# Patient Record
Sex: FEMALE | Employment: FULL TIME | URBAN - METROPOLITAN AREA
[De-identification: names, ages, dates, MRNs, and addresses within clinical notes are randomized per-mention and may not be internally consistent; named-entity substitution may affect disease eponyms.]

---

## 2017-02-06 ASSESSMENT — ACTIVITIES OF DAILY LIVING (ADL)
ARE_THERE_CARBON_MONOXIDE_DETECTORS_IN_YOUR_HOME?: Y
DO_MEMBERS_OF_YOUR_HOUSEHOLD_WEAR_SEAT_BELTS?: Y
DO_MEMBERS_OF_YOUR_HOUSEHOLD_USE_SUNSCREEN?: Y
ARE_THERE_FIREARMS_IN_YOUR_HOME?: N
ARE_THERE_SMOKE_DETECTORS_IN_YOUR_HOME?: Y
DO_MEMBERS_OF_YOUR_HOUSEHOLD_USE_SAFETY_HELMETS?: Y

## 2017-02-20 ENCOUNTER — OFFICE VISIT (OUTPATIENT)
Dept: INTERNAL MEDICINE | Facility: CLINIC | Age: 62
End: 2017-02-20

## 2017-02-20 VITALS
SYSTOLIC BLOOD PRESSURE: 130 MMHG | DIASTOLIC BLOOD PRESSURE: 78 MMHG | RESPIRATION RATE: 18 BRPM | BODY MASS INDEX: 28.88 KG/M2 | WEIGHT: 178.9 LBS | OXYGEN SATURATION: 94 % | HEART RATE: 86 BPM

## 2017-02-20 DIAGNOSIS — E03.4 HYPOTHYROIDISM DUE TO ACQUIRED ATROPHY OF THYROID: ICD-10-CM

## 2017-02-20 DIAGNOSIS — F33.41 RECURRENT MAJOR DEPRESSIVE DISORDER, IN PARTIAL REMISSION (H): ICD-10-CM

## 2017-02-20 DIAGNOSIS — R73.9 HYPERGLYCEMIA: ICD-10-CM

## 2017-02-20 DIAGNOSIS — R79.89 ELEVATED LIVER FUNCTION TESTS: ICD-10-CM

## 2017-02-20 DIAGNOSIS — E03.4 HYPOTHYROIDISM DUE TO ACQUIRED ATROPHY OF THYROID: Primary | ICD-10-CM

## 2017-02-20 LAB
ALBUMIN SERPL-MCNC: 3.9 G/DL (ref 3.4–5)
ALP SERPL-CCNC: 152 U/L (ref 40–150)
ALT SERPL W P-5'-P-CCNC: 69 U/L (ref 0–50)
ANION GAP SERPL CALCULATED.3IONS-SCNC: 9 MMOL/L (ref 3–14)
AST SERPL W P-5'-P-CCNC: 38 U/L (ref 0–45)
BILIRUB SERPL-MCNC: 0.3 MG/DL (ref 0.2–1.3)
BUN SERPL-MCNC: 18 MG/DL (ref 7–30)
CALCIUM SERPL-MCNC: 9.1 MG/DL (ref 8.5–10.1)
CHLORIDE SERPL-SCNC: 103 MMOL/L (ref 94–109)
CO2 SERPL-SCNC: 28 MMOL/L (ref 20–32)
CREAT SERPL-MCNC: 0.91 MG/DL (ref 0.52–1.04)
GFR SERPL CREATININE-BSD FRML MDRD: 63 ML/MIN/1.7M2
GLUCOSE SERPL-MCNC: 95 MG/DL (ref 70–99)
POTASSIUM SERPL-SCNC: 4.1 MMOL/L (ref 3.4–5.3)
PROT SERPL-MCNC: 7.7 G/DL (ref 6.8–8.8)
SODIUM SERPL-SCNC: 140 MMOL/L (ref 133–144)
TSH SERPL DL<=0.005 MIU/L-ACNC: 0.85 MU/L (ref 0.4–4)

## 2017-02-20 RX ORDER — BUPROPION HYDROCHLORIDE 300 MG/1
300 TABLET ORAL EVERY MORNING
Qty: 90 TABLET | Refills: 3 | Status: SHIPPED | OUTPATIENT
Start: 2017-02-20

## 2017-02-20 RX ORDER — VENLAFAXINE 37.5 MG/1
37.5 TABLET ORAL 2 TIMES DAILY
Qty: 180 TABLET | Refills: 3 | Status: SHIPPED | OUTPATIENT
Start: 2017-02-20

## 2017-02-20 ASSESSMENT — PAIN SCALES - GENERAL: PAINLEVEL: NO PAIN (0)

## 2017-02-20 NOTE — PROGRESS NOTES
Alysia Benavides is a 61 year old year old female being seen today for   Chief Complaint   Patient presents with     Physical     Patient is here for physical.        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  She has generally been doing well.  She has no specific complaints today.  Since I last saw her, her  was diagnosed with prostate cancer and so there has been stress in the family about that.  However, he is generally doing well.  She says her mood is a little bit down related to that as well as the current political climate, but overall, she feels like she is doing well.  She is helping care for her 2 grandchildren, a couple times a week and enjoys being helpful to her daughter and son-in-law.  PHQ today was 5.  Rest of review of systems was reviewed and was negative.           ASSESSMENT/PLAN:ASSESSMENT AND PLAN:  Generally healthy 61-year-old woman here for annual physical exam.  Current issues addressed today include:     1.  Hypothyroidism.  Check TSH on her current dose of levothyroxine which I refilled today.   2.  History of dysthymia or major depressive disorder in the past.  Currently in remission with PHQ-9 of 5 today.  We will refill her bupropion and venlafaxine at her current doses.   3.  History of mild hyperglycemia without diabetes.  We will check a glucose today.   4.  Previous elevated liver function tests, normal when last checked a year ago.  We will recheck on her current dose of venlafaxine.   5.  She is up to date on mammogram, colonoscopy and flu shot.  Pelvic exam was performed today.  Last Pap smear was 2 years ago and was normal with negative HPV testing.  Lipids were last checked in 2014.  She had a flu shot this year and is up to date on other immunizations.   6.  Follow up in 1 year or sooner as needed.                 Patient Active Problem List   Diagnosis     Depression     Hypothyroidism     Hyperglycemia     Elevated liver  enzymes       Family History   Problem Relation Age of Onset     CEREBROVASCULAR DISEASE Mother       at 94     Dementia Father      Coronary Artery Disease Father 84      at 96       Immunization History   Administered Date(s) Administered     Influenza (IIV3) 10/28/2013     Influenza (intradermal) 10/14/2015     Tdap (Adacel,Boostrix) 2009     Zoster vaccine, live 2013       Lab Results   Component Value Date    PAP NIL 2015       Results for orders placed or performed during the hospital encounter of 14   COLONOSCOPY   Result Value Ref Range    COLONOSCOPY       [facility], [place]  [address] [place] [address] [phone]     Endoscopy Department  _______________________________________________________________________________  Patient Name: Alysia Bneavides              Procedure Date: 3/6/2014 10:03:SS A3/P3     MRN: [id number]                       Account Number: [id number]                 YOB: 1955              Admit Type: Outpatient                      Age: 58                               [facility]                     Gender: Female                        Note Status: Finalized                      Attending MD: Rehana Reilly MD    Pause for the Cause: Pause for the cause   completed  _______________________________________________________________________________     Procedure:                Colonoscopy  Indications:              Screening for colorectal malignant neoplasm  Providers:                Rehana Peace MD, Jazmyne Farmer RN  Referring MD:             Mckenna Noe MD  Medicines:                Midazolam 4 mg IV, Fentanyl 100 micrograms IV  Complications:            No immediate complications  _______________________________________________________________________________  Procedure:                Pre-Anesthesia Assessment:                            - Prior to the procedure, a History and Physical                              was performed, and patient medications and                             allergies were reviewed. The patient is competent.                             The risks and benefits of the procedure and the                             sedation options and risks were discussed with the                             patient. All questions were answered and informed                             consent was obtained. Patient identification and                             proposed procedure were verified by the physician                             in the procedure room. Mental Status Examination:                             alert and oriented. Airway Examination: normal                             oropharyngeal airway and neck mobility. Respiratory                             Examination: clear to auscultation. CV Examination:                             normal. Prophylactic Antibiotics: The patient does                             not require prophylactic antibiotics. Prior                             Anticoagulants: The patient has taken no previous                             anticoagulant or antiplatelet agents. ASA Grade                             Assessment: I - A normal, healthy patient. After                             reviewing the risks and benefits, the patient was                             deemed in satisfactory condition to undergo the                             procedure. The anesthesia plan was to use moderate                             sedation / analgesia (conscious sedation).                             Immediately prior to administration of medications,                             the patient was re-assessed for adequacy to receive                             sedatives. The heart rate, respiratory rate, oxygen                             saturations, blood pressure, adequacy of pulmonary                             ventilation, and response to care were monitored                              throughout the procedure. The physical status of                             the patient was re-assessed after the procedure.                            After obtaining informed consent, the colonoscope                             was passed under direct vision. Throughout the                             procedure, the patient's blood pressure, pulse, and                             oxygen saturations were monitored continuously. The                             Colonoscope was introduced through the anus and                             advanced to the cecum, identified by appendiceal                             orifice & ileocecal valve. The colonoscopy was                             performed without difficulty. The patient tolerated                             the procedure well. The quality of the bowel                             preparation was excellent.                                                                                   Findings:       The perianal exam was abnormal. Findings include thrombosed external        hemorrhoids. The colon (entire examined portion) appeared normal.                                                                                   Impression:               - Thrombosed external hemorrhoids.                            - The entire examined colon is normal.  Recommendation:           - Repeat colonoscopy in 10 years for screening                             purposes.                                                                                     electronically signed by Rehana Peace  ______________________  Rehana Peace MD  Signed Date: 3/6/2014 11:03:SS A3/P3  Number of Addenda: 0  I was physically present for the entire viewing portion of the exam.  __________________________  Signature of teaching physician  B4c/D4c  Note Initiated On: 3/6/2014 10:03:SS A3/P3  Scope Withdrawal Time: 0 hours 0 minutes 0 seconds   Scope Withdrawal Time: 0 hours 0 minutes 0  seconds   Total Procedure Duration: 0 hours 0 minutes 0 seconds   Total Procedure Duration: 0 hours 0 minutes 0 seconds    ]    Ma Screening Digital Bilateral    Result Date: 7/20/2016  Narrative: Examination: Bilateral digital screening mammography with computer aided detection. Comparison: 7/14/2015, 5/27/2014, and 5/7/2013 History: No symptoms, routine screening.  Benign left breast biopsy. Per prior questionnaire, [employer] descent. BREAST DENSITY: Scattered fibroglandular densities. COMMENTS: No significant change.         ROS:  SEe below - reviewed with patient    Current Outpatient Prescriptions   Medication Sig Dispense Refill     venlafaxine (EFFEXOR) 37.5 MG tablet Take 1 tablet (37.5 mg) by mouth 2 times daily 180 tablet 3     levothyroxine (SYNTHROID, LEVOTHROID) 175 MCG tablet Take 1 tablet (175 mcg) by mouth daily 90 tablet 3     buPROPion (WELLBUTRIN XL) 300 MG 24 hr tablet Take 1 tablet (300 mg) by mouth every morning 90 tablet 3     nitrofurantoin, macrocrystal-monohydrate, (MACROBID) 100 MG capsule Take 1 capsule (100 mg) by mouth 2 times daily 20 capsule 3     Omega-3 Fatty Acids (OMEGA-3 FISH OIL PO) Take 1 capsule by mouth daily Omega-3 Krill oil       Multiple Vitamin (DAILY MULTIVITAMIN PO)        ascorbic acid (VITAMIN C) 1000 MG TABS Take 2,000 mg by mouth daily       Cholecalciferol (VITAMIN D) 2000 UNITS CAPS Take 4,000 Int'l Units by mouth daily       chromium 200 MCG CAPS Take 200 mcg by mouth daily       Cyanocobalamin (VITAMIN B-12) 2000 MCG TBCR Place under the tongue daily       Probiotic Product (PROBIOTIC DAILY PO)                  PHYSICAL EXAM:     /78  Pulse 86  Resp 18  Wt 81.1 kg (178 lb 14.4 oz)  SpO2 94%  Breastfeeding? No  BMI 28.88 kg/m2    Wt Readings from Last 1 Encounters:   02/20/17 81.1 kg (178 lb 14.4 oz)       Constitutional: no distress, comfortable, pleasant   Eyes: anicteric, normal extra-ocular movements   Ears, Nose and Throat: tympanic membranes  clear, nose clear and free of lesions, throat clear, neck supple with full range of motion, no thyromegaly.   Cardiovascular: regular rate and rhythm, normal S1 and S2, no murmurs, rubs or gallops, peripheral pulses full and symmetric   Respiratory: clear to auscultation, no wheezes or crackles, normal breath sounds   Gastrointestinal: positive bowel sounds, nontender, no hepatosplenomegaly, no masses   Gentiourinary: normal external genitalia,  no enlargement of the [place] or [place] glands, urethra normal, perineum normal and free of lesions,  no masses or cervical motion tenderness, adnexa without enlargement or lesions  Musculoskeletal: full range of motion, no edema   Skin: no concerning lesions, no jaundice   Breast: breast are symmetric, no lumps, no nipple discharge or dimpling  Neurological: cranial nerves intact, normal strength and sensation, reflexes at patella and biceps normal, normal gait, no tremor   Psychological: appropriate mood   Lymphatic: no cervical, supraclavicular or axillary lymphadenopathy      Mckenna Noe MD    Answers for HPI/ROS submitted by the patient on 2/6/2017   General Symptoms: No  Skin Symptoms: No  HENT Symptoms: No  EYE SYMPTOMS: No  HEART SYMPTOMS: No  LUNG SYMPTOMS: No  INTESTINAL SYMPTOMS: No  URINARY SYMPTOMS: No  GYNECOLOGIC SYMPTOMS: No  BREAST SYMPTOMS: No  SKELETAL SYMPTOMS: No  BLOOD SYMPTOMS: No  NERVOUS SYSTEM SYMPTOMS: No  MENTAL HEALTH SYMPTOMS: No

## 2017-02-20 NOTE — MR AVS SNAPSHOT
After Visit Summary   2/20/2017    Alysia Benavides    MRN: [id number]           Patient Information     Date Of Birth          1955        Visit Information        Provider Department      2/20/2017 4:30 PM Mckenna Noe MD [facility]        Today's Diagnoses     Hypothyroidism due to acquired atrophy of thyroid    -  1    Recurrent major depressive disorder, in partial remission (H)        Hyperglycemia        Elevated liver function tests          Care Instructions    Primary Care Center Medication Refill Request Information:  * Please contact your pharmacy regarding ANY request for medication refills.  ** [facility] Prescription Fax = [phone]  * Please allow 3 business days for routine medication refills.  * Please allow 5 business days for controlled substance medication refills.     Primary Care Center Test Result notification information:  *You will be notified with in 7-10 days of your appointment day regarding the results of your test.  If you are on MyChart you will be notified as soon as the provider has reviewed the results and signed off on them.    Primary Care Center Phone Number [phone]          Follow-ups after your visit        Who to contact     Please call your clinic at [phone] to:    Ask questions about your health    Make or cancel appointments    Discuss your medicines    Learn about your test results    Speak to your doctor   If you have compliments or concerns about an experience at your clinic, or if you wish to file a complaint, please contact [facility] Patient Relations at [phone] or email us at [email or web address]         Additional Information About Your Visit        MyChart Information     [employer] gives you secure access to your electronic health record. If you see a primary care provider, you can also send messages to your care team and make appointments. If you have questions, please call your  primary care clinic.  If you do not have a primary care provider, please call [phone] and they will assist you.      [employer]flux is an electronic gateway that provides easy, online access to your medical records. With [employer]flux, you can request a clinic appointment, read your test results, renew a prescription or communicate with your care team.     To access your existing account, please contact your [facility] or call [phone] for assistance.        Care EveryWhere ID     This is your Care EveryWhere ID. This could be used by other organizations to access your [facility] medical records  [id number]        Your Vitals Were     Pulse Respirations Pulse Oximetry Breastfeeding? BMI (Body Mass Index)       86 18 94% No 28.88 kg/m2        Blood Pressure from Last 3 Encounters:   02/20/17 130/78   01/06/16 112/70   03/11/15 129/74    Weight from Last 3 Encounters:   02/20/17 81.1 kg (178 lb 14.4 oz)   01/06/16 77.5 kg (170 lb 14.4 oz)   03/11/15 77.9 kg (171 lb 11.2 oz)                 Where to get your medicines      These medications were sent to [facility] - [address] [address]     Phone:  [phone]     buPROPion 300 MG 24 hr tablet    venlafaxine 37.5 MG tablet          Primary Care Provider Office Phone # Fax #    Mckenna Noe [phone]        [facility] [address] [place] [address] [address] [place] [id number]        Thank you!     Thank you for choosing [facility]  for your care. Our goal is always to provide you with excellent care. Hearing back from our patients is one way we can continue to improve our services. Please take a few minutes to complete the written survey that you may receive in the mail after your visit with us. Thank you!             Your Updated Medication List - Protect others around you: Learn how to safely use, store and throw away your  medicines at www.disposemymeds.org.          This list is accurate as of: 2/20/17 11:59 PM.  Always use your most recent med list.                   Brand Name Dispense Instructions for use    ascorbic acid 1000 MG Tabs    vitamin C     Take 2,000 mg by mouth daily       buPROPion 300 MG 24 hr tablet    WELLBUTRIN XL    90 tablet    Take 1 tablet (300 mg) by mouth every morning       chromium 200 MCG Caps capsule      Take 200 mcg by mouth daily       DAILY MULTIVITAMIN PO          levothyroxine 175 MCG tablet    SYNTHROID/LEVOTHROID    90 tablet    Take 1 tablet (175 mcg) by mouth daily       nitrofurantoin (macrocrystal-monohydrate) 100 MG capsule    MACROBID    20 capsule    Take 1 capsule (100 mg) by mouth 2 times daily       OMEGA-3 FISH OIL PO      Take 1 capsule by mouth daily Omega-3 Krill oil       PROBIOTIC DAILY PO          venlafaxine 37.5 MG tablet    EFFEXOR    180 tablet    Take 1 tablet (37.5 mg) by mouth 2 times daily       VITAMIN B-12 2000 MCG Tbcr      Place under the tongue daily       vitamin D 2000 UNITS Caps      Take 4,000 Int'l Units by mouth daily

## 2017-02-20 NOTE — PATIENT INSTRUCTIONS
Primary Care Center Medication Refill Request Information:  * Please contact your pharmacy regarding ANY request for medication refills.  ** [facility] Prescription Fax = [phone]  * Please allow 3 business days for routine medication refills.  * Please allow 5 business days for controlled substance medication refills.     [facility] Test Result notification information:  *You will be notified with in 7-10 days of your appointment day regarding the results of your test.  If you are on MyChart you will be notified as soon as the provider has reviewed the results and signed off on them.    Primary Care Center Phone Number [phone]

## 2017-02-20 NOTE — NURSING NOTE
Chief Complaint   Patient presents with     Physical     Patient is here for physical.      Nelsy Mabry LPN at 4:48 PM on 2/20/2017.

## 2017-02-21 ASSESSMENT — PATIENT HEALTH QUESTIONNAIRE - PHQ9: SUM OF ALL RESPONSES TO PHQ QUESTIONS 1-9: 5

## 2017-02-22 ENCOUNTER — TELEPHONE (OUTPATIENT)
Dept: INTERNAL MEDICINE | Facility: CLINIC | Age: 62
End: 2017-02-22

## 2017-02-22 DIAGNOSIS — R74.8 ELEVATED LIVER ENZYMES: Primary | ICD-10-CM

## 2017-02-26 DIAGNOSIS — E03.9 HYPOTHYROIDISM: ICD-10-CM

## 2017-02-27 RX ORDER — LEVOTHYROXINE SODIUM 175 UG/1
175 TABLET ORAL DAILY
Qty: 90 TABLET | Refills: 3 | Status: SHIPPED | OUTPATIENT
Start: 2017-02-27 | End: 2017-11-30

## 2017-02-27 NOTE — TELEPHONE ENCOUNTER
levothyroxine  175 MCG      Last Written Prescription Date:  2/18/16  Last Fill Quantity: 90,   # refills: 3  Last Office Visit : 2/20/17  Future Office visit:  None  TSH   Date Value Ref Range Status   02/20/2017 0.85 0.40 - 4.00 mU/L Final

## 2017-11-30 DIAGNOSIS — E03.9 HYPOTHYROIDISM: ICD-10-CM

## 2017-11-30 RX ORDER — LEVOTHYROXINE SODIUM 175 UG/1
175 TABLET ORAL DAILY
Qty: 90 TABLET | Refills: 0 | Status: SHIPPED | OUTPATIENT
Start: 2017-11-30

## 2018-05-14 ENCOUNTER — HEALTH MAINTENANCE LETTER (OUTPATIENT)
Age: 63
End: 2018-05-14

## 2019-05-16 ENCOUNTER — HEALTH MAINTENANCE LETTER (OUTPATIENT)
Age: 64
End: 2019-05-16

## 2020-03-02 ENCOUNTER — HEALTH MAINTENANCE LETTER (OUTPATIENT)
Age: 65
End: 2020-03-02

## 2020-12-20 ENCOUNTER — HEALTH MAINTENANCE LETTER (OUTPATIENT)
Age: 65
End: 2020-12-20

## 2021-04-24 ENCOUNTER — HEALTH MAINTENANCE LETTER (OUTPATIENT)
Age: 66
End: 2021-04-24

## 2021-10-03 ENCOUNTER — HEALTH MAINTENANCE LETTER (OUTPATIENT)
Age: 66
End: 2021-10-03

## 2022-03-20 ENCOUNTER — HEALTH MAINTENANCE LETTER (OUTPATIENT)
Age: 67
End: 2022-03-20

## 2022-05-15 ENCOUNTER — HEALTH MAINTENANCE LETTER (OUTPATIENT)
Age: 67
End: 2022-05-15

## 2022-09-10 ENCOUNTER — HEALTH MAINTENANCE LETTER (OUTPATIENT)
Age: 67
End: 2022-09-10

## 2023-06-03 ENCOUNTER — HEALTH MAINTENANCE LETTER (OUTPATIENT)
Age: 68
End: 2023-06-03